# Patient Record
Sex: FEMALE | Race: WHITE | NOT HISPANIC OR LATINO | Employment: OTHER | URBAN - METROPOLITAN AREA
[De-identification: names, ages, dates, MRNs, and addresses within clinical notes are randomized per-mention and may not be internally consistent; named-entity substitution may affect disease eponyms.]

---

## 2020-01-07 ENCOUNTER — CLINICAL SUPPORT (OUTPATIENT)
Dept: CARDIOLOGY | Facility: HOSPITAL | Age: 80
End: 2020-01-07
Attending: INTERNAL MEDICINE
Payer: MEDICARE

## 2020-01-07 ENCOUNTER — INITIAL CONSULT (OUTPATIENT)
Dept: ELECTROPHYSIOLOGY | Facility: CLINIC | Age: 80
End: 2020-01-07
Payer: MEDICARE

## 2020-01-07 VITALS
HEART RATE: 78 BPM | WEIGHT: 172.19 LBS | HEIGHT: 65 IN | SYSTOLIC BLOOD PRESSURE: 130 MMHG | BODY MASS INDEX: 28.69 KG/M2 | DIASTOLIC BLOOD PRESSURE: 60 MMHG

## 2020-01-07 DIAGNOSIS — I50.42 CHRONIC COMBINED SYSTOLIC AND DIASTOLIC HEART FAILURE: Chronic | ICD-10-CM

## 2020-01-07 DIAGNOSIS — I10 ESSENTIAL HYPERTENSION: Chronic | ICD-10-CM

## 2020-01-07 DIAGNOSIS — Z95.810 ICD (IMPLANTABLE CARDIOVERTER-DEFIBRILLATOR), DUAL, IN SITU: ICD-10-CM

## 2020-01-07 DIAGNOSIS — I49.8 OTHER SPECIFIED CARDIAC ARRHYTHMIAS: ICD-10-CM

## 2020-01-07 DIAGNOSIS — G47.33 OBSTRUCTIVE SLEEP APNEA: Chronic | ICD-10-CM

## 2020-01-07 DIAGNOSIS — N18.4 TYPE 2 DIABETES MELLITUS WITH STAGE 4 CHRONIC KIDNEY DISEASE, WITHOUT LONG-TERM CURRENT USE OF INSULIN: Chronic | ICD-10-CM

## 2020-01-07 DIAGNOSIS — E03.9 ACQUIRED HYPOTHYROIDISM: Chronic | ICD-10-CM

## 2020-01-07 DIAGNOSIS — I49.8 OTHER SPECIFIED CARDIAC ARRHYTHMIAS: Primary | ICD-10-CM

## 2020-01-07 DIAGNOSIS — N18.4 CHRONIC KIDNEY DISEASE, STAGE 4 (SEVERE): Chronic | ICD-10-CM

## 2020-01-07 DIAGNOSIS — E11.22 TYPE 2 DIABETES MELLITUS WITH STAGE 4 CHRONIC KIDNEY DISEASE, WITHOUT LONG-TERM CURRENT USE OF INSULIN: Chronic | ICD-10-CM

## 2020-01-07 DIAGNOSIS — I44.7 LBBB (LEFT BUNDLE BRANCH BLOCK): Primary | Chronic | ICD-10-CM

## 2020-01-07 DIAGNOSIS — I48.0 PAROXYSMAL ATRIAL FIBRILLATION: Chronic | ICD-10-CM

## 2020-01-07 DIAGNOSIS — I50.42 CHRONIC COMBINED SYSTOLIC AND DIASTOLIC HEART FAILURE: Primary | ICD-10-CM

## 2020-01-07 DIAGNOSIS — Z95.810 ICD (IMPLANTABLE CARDIOVERTER-DEFIBRILLATOR), DUAL, IN SITU: Primary | ICD-10-CM

## 2020-01-07 PROCEDURE — 93010 RHYTHM STRIP: ICD-10-PCS | Mod: S$PBB,,, | Performed by: INTERNAL MEDICINE

## 2020-01-07 PROCEDURE — 99205 PR OFFICE/OUTPT VISIT, NEW, LEVL V, 60-74 MIN: ICD-10-PCS | Mod: S$PBB,,, | Performed by: INTERNAL MEDICINE

## 2020-01-07 PROCEDURE — 1159F MED LIST DOCD IN RCRD: CPT | Mod: ,,, | Performed by: INTERNAL MEDICINE

## 2020-01-07 PROCEDURE — 1125F PR PAIN SEVERITY QUANTIFIED, PAIN PRESENT: ICD-10-PCS | Mod: ,,, | Performed by: INTERNAL MEDICINE

## 2020-01-07 PROCEDURE — 99205 OFFICE O/P NEW HI 60 MIN: CPT | Mod: S$PBB,,, | Performed by: INTERNAL MEDICINE

## 2020-01-07 PROCEDURE — 99999 PR PBB SHADOW E&M-NEW PATIENT-LVL IV: CPT | Mod: PBBFAC,,, | Performed by: INTERNAL MEDICINE

## 2020-01-07 PROCEDURE — 93010 ELECTROCARDIOGRAM REPORT: CPT | Mod: S$PBB,,, | Performed by: INTERNAL MEDICINE

## 2020-01-07 PROCEDURE — 1125F AMNT PAIN NOTED PAIN PRSNT: CPT | Mod: ,,, | Performed by: INTERNAL MEDICINE

## 2020-01-07 PROCEDURE — 99204 OFFICE O/P NEW MOD 45 MIN: CPT | Mod: PBBFAC,25 | Performed by: INTERNAL MEDICINE

## 2020-01-07 PROCEDURE — 1159F PR MEDICATION LIST DOCUMENTED IN MEDICAL RECORD: ICD-10-PCS | Mod: ,,, | Performed by: INTERNAL MEDICINE

## 2020-01-07 PROCEDURE — 99999 PR PBB SHADOW E&M-NEW PATIENT-LVL IV: ICD-10-PCS | Mod: PBBFAC,,, | Performed by: INTERNAL MEDICINE

## 2020-01-07 PROCEDURE — 93005 ELECTROCARDIOGRAM TRACING: CPT | Mod: PBBFAC | Performed by: INTERNAL MEDICINE

## 2020-01-07 PROCEDURE — 93283 PRGRMG EVAL IMPLANTABLE DFB: CPT

## 2020-01-07 RX ORDER — CARVEDILOL 12.5 MG/1
12.5 TABLET ORAL 2 TIMES DAILY WITH MEALS
COMMUNITY

## 2020-01-07 RX ORDER — LANOLIN ALCOHOL/MO/W.PET/CERES
100 CREAM (GRAM) TOPICAL DAILY
COMMUNITY

## 2020-01-07 RX ORDER — CHOLECALCIFEROL (VITAMIN D3) 25 MCG
1000 TABLET ORAL DAILY
COMMUNITY

## 2020-01-07 RX ORDER — PANTOPRAZOLE SODIUM 40 MG/1
40 TABLET, DELAYED RELEASE ORAL DAILY
COMMUNITY

## 2020-01-07 RX ORDER — FUROSEMIDE 80 MG/1
80 TABLET ORAL 2 TIMES DAILY
COMMUNITY

## 2020-01-07 RX ORDER — ATORVASTATIN CALCIUM 80 MG/1
80 TABLET, FILM COATED ORAL DAILY
COMMUNITY

## 2020-01-07 RX ORDER — NAPROXEN SODIUM 220 MG/1
81 TABLET, FILM COATED ORAL DAILY
COMMUNITY

## 2020-01-07 RX ORDER — FERROUS SULFATE 325(65) MG
325 TABLET ORAL
COMMUNITY

## 2020-01-07 RX ORDER — FLUOXETINE HYDROCHLORIDE 20 MG/1
20 CAPSULE ORAL DAILY
COMMUNITY

## 2020-01-07 RX ORDER — AMIODARONE HYDROCHLORIDE 200 MG/1
TABLET ORAL DAILY
COMMUNITY

## 2020-01-07 RX ORDER — LEVOTHYROXINE SODIUM 100 UG/1
100 TABLET ORAL DAILY
COMMUNITY

## 2020-01-07 RX ORDER — CALCIUM CARBONATE 600 MG
600 TABLET ORAL ONCE
COMMUNITY

## 2020-01-07 RX ORDER — BISACODYL 5 MG
5 TABLET, DELAYED RELEASE (ENTERIC COATED) ORAL DAILY PRN
COMMUNITY

## 2020-01-07 RX ORDER — AMLODIPINE BESYLATE 2.5 MG/1
2.5 TABLET ORAL DAILY
COMMUNITY

## 2020-01-07 NOTE — H&P (VIEW-ONLY)
Subjective:    Patient ID:  Iwona Gupta is a 79 y.o. female who presents for evaluation of Congestive Heart Failure and LBBB    Referring Cardiologist: Artur Morrison MD ([facility])    HPI  I had the pleasure of seeing Ms. Gupta in our electrophysiology clinic in consultation for her nonischemic cardiomyopathy. As you are aware she is a pleasant 79 year-old woman with chronic nonischemic CMP (EF<30%, NYHA class II symptoms) despite long-term goal directed medical therapy with wide LBBB, paroxysmal atrial fibrillation, chronic kidney disease stage 4, hypertension, type 2 diabetes mellitus, hypothyroidism, and obstructive sleep apnea. She underwent attempt at CRT-D implantation January of 2019 that was complicated by dissection of the coronary sinus. A dual chamber ICD was implanted instead. She returned for reattempt at implantation September of 2019 and was unable to have the lead delivered. She presents today for evaluation for 3rd attempt at implantation of a coronary sinus lead. She has a cardiomems implant.    An echocaerdiogram from 1/25/2019 noted an LVEF of 30-35%    Device interrogation noted implant date 1/2019, dcICD, stable lead parameters, 81% RA and 1% RV pacing, no events    My interpretation of today's in clinic electrocardiogram is sinus rhythm with a rate of 78 bpm with MS interval of 280msec and left bundle type IVCD at 160msec.    Review of Systems   Constitution: Positive for malaise/fatigue. Negative for fever.   HENT: Negative for congestion and sore throat.    Eyes: Negative for blurred vision and visual disturbance.   Cardiovascular: Positive for dyspnea on exertion. Negative for chest pain, irregular heartbeat, leg swelling, near-syncope, orthopnea, palpitations, paroxysmal nocturnal dyspnea and syncope.   Respiratory: Negative for cough and shortness of breath.    Hematologic/Lymphatic: Negative for bleeding problem. Bruises/bleeds easily.   Skin: Negative.    Musculoskeletal: Negative.     Gastrointestinal: Negative for bloating, abdominal pain, hematochezia and melena.   Neurological: Negative for focal weakness and weakness.        Objective:    Physical Exam   Constitutional: She is oriented to person, place, and time. She appears well-developed and well-nourished. No distress.   HENT:   Head: Normocephalic and atraumatic.   Eyes: Conjunctivae are normal. Right eye exhibits no discharge. Left eye exhibits no discharge.   Neck: Neck supple. No JVD present.   Cardiovascular: Normal rate and regular rhythm. Exam reveals no gallop and no friction rub.   No murmur heard.  Pulmonary/Chest: Effort normal and breath sounds normal. No respiratory distress. She has no wheezes. She has no rales.   Abdominal: Soft. Bowel sounds are normal. She exhibits no distension. There is no tenderness. There is no rebound.   Musculoskeletal: She exhibits no edema.   Neurological: She is alert and oriented to person, place, and time.   Skin: Skin is warm and dry. No rash noted. She is not diaphoretic. No erythema.   Psychiatric: She has a normal mood and affect. Her behavior is normal. Judgment and thought content normal.   Vitals reviewed.        Assessment:       1. LBBB (left bundle branch block)    2. Chronic combined systolic and diastolic heart failure    3. Paroxysmal atrial fibrillation    4. Essential hypertension    5. Chronic kidney disease, stage 4 (severe)    6. Acquired hypothyroidism    7. Obstructive sleep apnea    8. Type 2 diabetes mellitus with stage 4 chronic kidney disease, without long-term current use of insulin    9. Other specified cardiac arrhythmias         Plan:       In summary, Ms. Gupta is a pleasant 79 year-old woman with chronic nonischemic CMP (EF<30%, NYHA class II symptoms) despite long-term goal directed medical therapy with wide LBBB (left bundle type IVCD on our in-clinic electrocardiogram), paroxysmal atrial fibrillation, chronic kidney disease stage 4, hypertension, type 2  diabetes mellitus, hypothyroidism, and obstructive sleep apnea presenting for consideration for 3rd attempt at LV lead addition due to challenging anatomy. We discussed the alternatives, benefits and risks of the procedure including pain, infection, bleeding, injury to lung causing pneumothorax requiring tube placement, injury to heart valves, puncture of the heart leading to pericardial effusion or tamponade requiring tube drainage, heart attack, stroke and death. She is willing to try a 3rd attempt. If unable to access the coronary sinus or deliver a lead, will try pacing in the his-bundle region to see if this can sufficiently narrow the QRS. If this does not give a good result then last option would be to refer to Dr. Alvarenga with CT surgery for epicardial LV lead placement. She understood and desired to proceed. Consent signed. She had no further questions regarding the planned procedure.    Thank you for allowing me to participate in the care of this patient. Please do not hesitate to call me with any questions or concerns.    Jose Tse MD, PhD  Cardiac Electrophysiology

## 2020-01-07 NOTE — PROGRESS NOTES
Subjective:    Patient ID:  Iwona Gupta is a 79 y.o. female who presents for evaluation of Congestive Heart Failure and LBBB    Referring Cardiologist: Artur Morrison MD ([facility])    HPI  I had the pleasure of seeing Ms. Gupta in our electrophysiology clinic in consultation for her nonischemic cardiomyopathy. As you are aware she is a pleasant 79 year-old woman with chronic nonischemic CMP (EF<30%, NYHA class II symptoms) despite long-term goal directed medical therapy with wide LBBB, paroxysmal atrial fibrillation, chronic kidney disease stage 4, hypertension, type 2 diabetes mellitus, hypothyroidism, and obstructive sleep apnea. She underwent attempt at CRT-D implantation January of 2019 that was complicated by dissection of the coronary sinus. A dual chamber ICD was implanted instead. She returned for reattempt at implantation September of 2019 and was unable to have the lead delivered. She presents today for evaluation for 3rd attempt at implantation of a coronary sinus lead. She has a cardiomems implant.    An echocaerdiogram from 1/25/2019 noted an LVEF of 30-35%    Device interrogation noted implant date 1/2019, dcICD, stable lead parameters, 81% RA and 1% RV pacing, no events    My interpretation of today's in clinic electrocardiogram is sinus rhythm with a rate of 78 bpm with [place] interval of 280msec and left bundle type IVCD at 160msec.    Review of Systems   Constitution: Positive for malaise/fatigue. Negative for fever.   HENT: Negative for congestion and sore throat.    Eyes: Negative for blurred vision and visual disturbance.   Cardiovascular: Positive for dyspnea on exertion. Negative for chest pain, irregular heartbeat, leg swelling, near-syncope, orthopnea, palpitations, paroxysmal nocturnal dyspnea and syncope.   Respiratory: Negative for cough and shortness of breath.    Hematologic/Lymphatic: Negative for bleeding problem. Bruises/bleeds easily.   Skin: Negative.    Musculoskeletal: Negative.     Gastrointestinal: Negative for bloating, abdominal pain, hematochezia and melena.   Neurological: Negative for focal weakness and weakness.        Objective:    Physical Exam   Constitutional: She is oriented to person, place, and time. She appears well-developed and well-nourished. No distress.   HENT:   Head: Normocephalic and atraumatic.   Eyes: Conjunctivae are normal. Right eye exhibits no discharge. Left eye exhibits no discharge.   Neck: Neck supple. No JVD present.   Cardiovascular: Normal rate and regular rhythm. Exam reveals no gallop and no friction rub.   No murmur heard.  Pulmonary/Chest: Effort normal and breath sounds normal. No respiratory distress. She has no wheezes. She has no rales.   Abdominal: Soft. Bowel sounds are normal. She exhibits no distension. There is no tenderness. There is no rebound.   Musculoskeletal: She exhibits no edema.   Neurological: She is alert and oriented to person, place, and time.   Skin: Skin is warm and dry. No rash noted. She is not diaphoretic. No erythema.   Psychiatric: She has a normal mood and affect. Her behavior is normal. Judgment and thought content normal.   Vitals reviewed.        Assessment:       1. LBBB (left bundle branch block)    2. Chronic combined systolic and diastolic heart failure    3. Paroxysmal atrial fibrillation    4. Essential hypertension    5. Chronic kidney disease, stage 4 (severe)    6. Acquired hypothyroidism    7. Obstructive sleep apnea    8. Type 2 diabetes mellitus with stage 4 chronic kidney disease, without long-term current use of insulin    9. Other specified cardiac arrhythmias         Plan:       In summary, Ms. Gupta is a pleasant 79 year-old woman with chronic nonischemic CMP (EF<30%, NYHA class II symptoms) despite long-term goal directed medical therapy with wide LBBB (left bundle type IVCD on our in-clinic electrocardiogram), paroxysmal atrial fibrillation, chronic kidney disease stage 4, hypertension, type 2  diabetes mellitus, hypothyroidism, and obstructive sleep apnea presenting for consideration for 3rd attempt at LV lead addition due to challenging anatomy. We discussed the alternatives, benefits and risks of the procedure including pain, infection, bleeding, injury to lung causing pneumothorax requiring tube placement, injury to heart valves, puncture of the heart leading to pericardial effusion or tamponade requiring tube drainage, heart attack, stroke and death. She is willing to try a 3rd attempt. If unable to access the coronary sinus or deliver a lead, will try pacing in the his-bundle region to see if this can sufficiently narrow the QRS. If this does not give a good result then last option would be to refer to Dr. Alvarenga with CT surgery for epicardial LV lead placement. She understood and desired to proceed. Consent signed. She had no further questions regarding the planned procedure.    Thank you for allowing me to participate in the care of this patient. Please do not hesitate to call me with any questions or concerns.    Jose Tse MD, PhD  Cardiac Electrophysiology

## 2020-01-08 ENCOUNTER — TELEPHONE (OUTPATIENT)
Dept: ELECTROPHYSIOLOGY | Facility: CLINIC | Age: 80
End: 2020-01-08

## 2020-01-08 DIAGNOSIS — I48.0 PAROXYSMAL ATRIAL FIBRILLATION: ICD-10-CM

## 2020-01-08 DIAGNOSIS — I44.7 LBBB (LEFT BUNDLE BRANCH BLOCK): ICD-10-CM

## 2020-01-08 DIAGNOSIS — I50.42 CHRONIC COMBINED SYSTOLIC AND DIASTOLIC HEART FAILURE: Primary | ICD-10-CM

## 2020-01-08 DIAGNOSIS — Z95.810 ICD (IMPLANTABLE CARDIOVERTER-DEFIBRILLATOR), DUAL, IN SITU: ICD-10-CM

## 2020-01-08 NOTE — TELEPHONE ENCOUNTER
----- Message from Jose Tse MD sent at 1/7/2020  4:06 PM CST -----  Can you fax my note on Ms. Gupta to Dr. Morrison?    [phone]    Thanks

## 2020-01-15 ENCOUNTER — TELEPHONE (OUTPATIENT)
Dept: ELECTROPHYSIOLOGY | Facility: CLINIC | Age: 80
End: 2020-01-15

## 2020-01-15 NOTE — TELEPHONE ENCOUNTER
----- Message from Baldo Bahena MA sent at 1/15/2020  9:33 AM CST -----  Contact: pt      ----- Message -----  From: Beti Montelongo  Sent: 1/14/2020   5:50 PM CST  To: Dedrick Garcia A Staff    Pt called she lost paper with meds she is to stop before surgery and needs to find out the info    Pt can be reached at [phone]

## 2020-01-15 NOTE — TELEPHONE ENCOUNTER
Spoke with Ms. Bustillo.  Reviewed medication instructions as listed below:    -Hold Eliquis 3 days prior to procedure.  Last dose taken on evening of 2/1/20  -Hold Januvia, Amlodipine, Entresto, and Lasix day of procedure.  Last dose taken on 2/4/20.    Ms. Bustillo verbalizes understanding and will call with any additional questions or concerns.

## 2020-02-03 ENCOUNTER — PATIENT MESSAGE (OUTPATIENT)
Dept: ELECTROPHYSIOLOGY | Facility: CLINIC | Age: 80
End: 2020-02-03

## 2020-02-03 NOTE — TELEPHONE ENCOUNTER
Sung Gilman Ms. Candy is scheduled to have a MDT CRT-D upgrade with Dr. Tse on 2/5/2020.    Thanks,    Cori

## 2020-02-04 ENCOUNTER — TELEPHONE (OUTPATIENT)
Dept: ELECTROPHYSIOLOGY | Facility: CLINIC | Age: 80
End: 2020-02-04

## 2020-02-05 ENCOUNTER — ANESTHESIA EVENT (OUTPATIENT)
Dept: MEDSURG UNIT | Facility: HOSPITAL | Age: 80
End: 2020-02-05
Payer: MEDICARE

## 2020-02-05 ENCOUNTER — HOSPITAL ENCOUNTER (OUTPATIENT)
Facility: HOSPITAL | Age: 80
Discharge: HOME | End: 2020-02-06
Attending: INTERNAL MEDICINE | Admitting: INTERNAL MEDICINE
Payer: MEDICARE

## 2020-02-05 ENCOUNTER — ANESTHESIA (OUTPATIENT)
Dept: MEDSURG UNIT | Facility: HOSPITAL | Age: 80
End: 2020-02-05
Payer: MEDICARE

## 2020-02-05 DIAGNOSIS — I49.9 ARRHYTHMIA: ICD-10-CM

## 2020-02-05 DIAGNOSIS — I48.0 PAROXYSMAL ATRIAL FIBRILLATION: ICD-10-CM

## 2020-02-05 DIAGNOSIS — I44.7 LBBB (LEFT BUNDLE BRANCH BLOCK): ICD-10-CM

## 2020-02-05 DIAGNOSIS — I50.42 CHRONIC COMBINED SYSTOLIC AND DIASTOLIC HEART FAILURE: ICD-10-CM

## 2020-02-05 DIAGNOSIS — Z95.810 ICD (IMPLANTABLE CARDIOVERTER-DEFIBRILLATOR), DUAL, IN SITU: ICD-10-CM

## 2020-02-05 DIAGNOSIS — Z01.812 PRE-PROCEDURE LAB EXAM: ICD-10-CM

## 2020-02-05 LAB
POCT GLUCOSE: 103 MG/DL (ref 70–110)
POCT GLUCOSE: 136 MG/DL (ref 70–110)

## 2020-02-05 PROCEDURE — 33225 L VENTRIC PACING LEAD ADD-ON: CPT | Mod: ,,, | Performed by: INTERNAL MEDICINE

## 2020-02-05 PROCEDURE — 93010 ELECTROCARDIOGRAM REPORT: CPT | Mod: 76,,, | Performed by: INTERNAL MEDICINE

## 2020-02-05 PROCEDURE — 33264 PR REMV&REPLC CVD GEN MULT LEAD: ICD-10-PCS | Mod: 22,,, | Performed by: INTERNAL MEDICINE

## 2020-02-05 PROCEDURE — 33264 RMVL & RPLCMT DFB GEN MLT LD: CPT | Mod: 22 | Performed by: INTERNAL MEDICINE

## 2020-02-05 PROCEDURE — 37000009 HC ANESTHESIA EA ADD 15 MINS: Performed by: INTERNAL MEDICINE

## 2020-02-05 PROCEDURE — 25000003 PHARM REV CODE 250: Performed by: STUDENT IN AN ORGANIZED HEALTH CARE EDUCATION/TRAINING PROGRAM

## 2020-02-05 PROCEDURE — 25000003 PHARM REV CODE 250: Performed by: NURSE ANESTHETIST, CERTIFIED REGISTERED

## 2020-02-05 PROCEDURE — 63600175 PHARM REV CODE 636 W HCPCS: Performed by: NURSE ANESTHETIST, CERTIFIED REGISTERED

## 2020-02-05 PROCEDURE — 33225 PR INSRT PACING ELECT,AT INSERT NEW DEVICE: ICD-10-PCS | Mod: ,,, | Performed by: INTERNAL MEDICINE

## 2020-02-05 PROCEDURE — C1887 CATHETER, GUIDING: HCPCS | Performed by: INTERNAL MEDICINE

## 2020-02-05 PROCEDURE — 93005 ELECTROCARDIOGRAM TRACING: CPT | Mod: 59

## 2020-02-05 PROCEDURE — 33225 L VENTRIC PACING LEAD ADD-ON: CPT | Performed by: INTERNAL MEDICINE

## 2020-02-05 PROCEDURE — 82962 GLUCOSE BLOOD TEST: CPT

## 2020-02-05 PROCEDURE — D9220A PRA ANESTHESIA: ICD-10-PCS | Mod: ANES,,, | Performed by: ANESTHESIOLOGY

## 2020-02-05 PROCEDURE — D9220A PRA ANESTHESIA: Mod: CRNA,,, | Performed by: NURSE ANESTHETIST, CERTIFIED REGISTERED

## 2020-02-05 PROCEDURE — C1882 AICD, OTHER THAN SING/DUAL: HCPCS | Performed by: INTERNAL MEDICINE

## 2020-02-05 PROCEDURE — D9220A PRA ANESTHESIA: ICD-10-PCS | Mod: CRNA,,, | Performed by: NURSE ANESTHETIST, CERTIFIED REGISTERED

## 2020-02-05 PROCEDURE — 25500020 PHARM REV CODE 255: Performed by: NURSE ANESTHETIST, CERTIFIED REGISTERED

## 2020-02-05 PROCEDURE — 93010 ELECTROCARDIOGRAM REPORT: CPT | Mod: ,,, | Performed by: INTERNAL MEDICINE

## 2020-02-05 PROCEDURE — 37000008 HC ANESTHESIA 1ST 15 MINUTES: Performed by: INTERNAL MEDICINE

## 2020-02-05 PROCEDURE — C2628 CATHETER, OCCLUSION: HCPCS | Performed by: INTERNAL MEDICINE

## 2020-02-05 PROCEDURE — 63600175 PHARM REV CODE 636 W HCPCS: Performed by: NURSE PRACTITIONER

## 2020-02-05 PROCEDURE — 25000003 PHARM REV CODE 250: Performed by: INTERNAL MEDICINE

## 2020-02-05 PROCEDURE — A4216 STERILE WATER/SALINE, 10 ML: HCPCS | Performed by: INTERNAL MEDICINE

## 2020-02-05 PROCEDURE — 94761 N-INVAS EAR/PLS OXIMETRY MLT: CPT

## 2020-02-05 PROCEDURE — 33264 RMVL & RPLCMT DFB GEN MLT LD: CPT | Mod: 22,,, | Performed by: INTERNAL MEDICINE

## 2020-02-05 PROCEDURE — 82962 GLUCOSE BLOOD TEST: CPT | Performed by: INTERNAL MEDICINE

## 2020-02-05 PROCEDURE — C1900 LEAD, CORONARY VENOUS: HCPCS | Performed by: INTERNAL MEDICINE

## 2020-02-05 PROCEDURE — 87075 CULTR BACTERIA EXCEPT BLOOD: CPT

## 2020-02-05 PROCEDURE — 25500020 PHARM REV CODE 255: Performed by: INTERNAL MEDICINE

## 2020-02-05 PROCEDURE — C1779 LEAD, PMKR, TRANSVENOUS VDD: HCPCS | Performed by: INTERNAL MEDICINE

## 2020-02-05 PROCEDURE — C1894 INTRO/SHEATH, NON-LASER: HCPCS | Performed by: INTERNAL MEDICINE

## 2020-02-05 PROCEDURE — 27201423 OPTIME MED/SURG SUP & DEVICES STERILE SUPPLY: Performed by: INTERNAL MEDICINE

## 2020-02-05 PROCEDURE — C1769 GUIDE WIRE: HCPCS | Performed by: INTERNAL MEDICINE

## 2020-02-05 PROCEDURE — A4216 STERILE WATER/SALINE, 10 ML: HCPCS | Performed by: NURSE ANESTHETIST, CERTIFIED REGISTERED

## 2020-02-05 PROCEDURE — 93005 ELECTROCARDIOGRAM TRACING: CPT

## 2020-02-05 PROCEDURE — 93010 EKG 12-LEAD: ICD-10-PCS | Mod: ,,, | Performed by: INTERNAL MEDICINE

## 2020-02-05 PROCEDURE — 87070 CULTURE OTHR SPECIMN AEROBIC: CPT

## 2020-02-05 PROCEDURE — D9220A PRA ANESTHESIA: Mod: ANES,,, | Performed by: ANESTHESIOLOGY

## 2020-02-05 PROCEDURE — 63600175 PHARM REV CODE 636 W HCPCS: Performed by: INTERNAL MEDICINE

## 2020-02-05 DEVICE — LEAD 383069 MRI US
Type: IMPLANTABLE DEVICE | Site: HEART | Status: FUNCTIONAL
Brand: SELECTSECURE™ MRI SURESCAN™

## 2020-02-05 DEVICE — CRTD DTMB1D4 AMPLIA MRI CRTD US
Type: IMPLANTABLE DEVICE | Site: CHEST | Status: FUNCTIONAL
Brand: AMPLIA MRI™ CRT-D SURESCAN™

## 2020-02-05 RX ORDER — LEVOTHYROXINE SODIUM 100 UG/1
100 TABLET ORAL DAILY
Status: CANCELLED | OUTPATIENT
Start: 2020-02-06

## 2020-02-05 RX ORDER — CEFAZOLIN SODIUM 1 G/3ML
2 INJECTION, POWDER, FOR SOLUTION INTRAMUSCULAR; INTRAVENOUS
Status: COMPLETED | OUTPATIENT
Start: 2020-02-05 | End: 2020-02-05

## 2020-02-05 RX ORDER — FUROSEMIDE 20 MG/1
80 TABLET ORAL 2 TIMES DAILY
Status: CANCELLED | OUTPATIENT
Start: 2020-02-06

## 2020-02-05 RX ORDER — CARVEDILOL 3.12 MG/1
12.5 TABLET ORAL 2 TIMES DAILY WITH MEALS
Status: CANCELLED | OUTPATIENT
Start: 2020-02-05

## 2020-02-05 RX ORDER — IBUPROFEN 200 MG
16 TABLET ORAL
Status: DISCONTINUED | OUTPATIENT
Start: 2020-02-05 | End: 2020-02-06 | Stop reason: HOSPADM

## 2020-02-05 RX ORDER — AMIODARONE HYDROCHLORIDE 200 MG/1
200 TABLET ORAL DAILY
Status: CANCELLED | OUTPATIENT
Start: 2020-02-06

## 2020-02-05 RX ORDER — ACETAMINOPHEN 325 MG/1
650 TABLET ORAL EVERY 4 HOURS PRN
Status: DISCONTINUED | OUTPATIENT
Start: 2020-02-05 | End: 2020-02-06 | Stop reason: HOSPADM

## 2020-02-05 RX ORDER — VANCOMYCIN HYDROCHLORIDE 1 G/20ML
INJECTION, POWDER, LYOPHILIZED, FOR SOLUTION INTRAVENOUS
Status: DISCONTINUED | OUTPATIENT
Start: 2020-02-05 | End: 2020-02-06 | Stop reason: HOSPADM

## 2020-02-05 RX ORDER — PROPOFOL 10 MG/ML
VIAL (ML) INTRAVENOUS
Status: DISCONTINUED | OUTPATIENT
Start: 2020-02-05 | End: 2020-02-05

## 2020-02-05 RX ORDER — LIDOCAINE HYDROCHLORIDE 20 MG/ML
INJECTION, SOLUTION INFILTRATION; PERINEURAL
Status: DISCONTINUED | OUTPATIENT
Start: 2020-02-05 | End: 2020-02-06 | Stop reason: HOSPADM

## 2020-02-05 RX ORDER — IODIXANOL 320 MG/ML
INJECTION, SOLUTION INTRAVASCULAR
Status: DISCONTINUED | OUTPATIENT
Start: 2020-02-05 | End: 2020-02-06 | Stop reason: HOSPADM

## 2020-02-05 RX ORDER — GLUCAGON 1 MG
1 KIT INJECTION
Status: DISCONTINUED | OUTPATIENT
Start: 2020-02-05 | End: 2020-02-06 | Stop reason: HOSPADM

## 2020-02-05 RX ORDER — DEXMEDETOMIDINE HYDROCHLORIDE 100 UG/ML
INJECTION, SOLUTION INTRAVENOUS
Status: DISCONTINUED | OUTPATIENT
Start: 2020-02-05 | End: 2020-02-05

## 2020-02-05 RX ORDER — ATORVASTATIN CALCIUM 20 MG/1
80 TABLET, FILM COATED ORAL NIGHTLY
Status: CANCELLED | OUTPATIENT
Start: 2020-02-05

## 2020-02-05 RX ORDER — SODIUM CHLORIDE 9 MG/ML
INJECTION, SOLUTION INTRAVENOUS CONTINUOUS
Status: DISCONTINUED | OUTPATIENT
Start: 2020-02-05 | End: 2020-02-05

## 2020-02-05 RX ORDER — PANTOPRAZOLE SODIUM 40 MG/1
40 TABLET, DELAYED RELEASE ORAL DAILY
Status: CANCELLED | OUTPATIENT
Start: 2020-02-06

## 2020-02-05 RX ORDER — FENTANYL CITRATE 50 UG/ML
25 INJECTION, SOLUTION INTRAMUSCULAR; INTRAVENOUS EVERY 5 MIN PRN
Status: DISCONTINUED | OUTPATIENT
Start: 2020-02-05 | End: 2020-02-05 | Stop reason: HOSPADM

## 2020-02-05 RX ORDER — SODIUM CHLORIDE 0.9 % (FLUSH) 0.9 %
3 SYRINGE (ML) INJECTION
Status: DISCONTINUED | OUTPATIENT
Start: 2020-02-05 | End: 2020-02-05 | Stop reason: HOSPADM

## 2020-02-05 RX ORDER — SODIUM CHLORIDE 9 MG/ML
INJECTION, SOLUTION INTRAVENOUS CONTINUOUS PRN
Status: DISCONTINUED | OUTPATIENT
Start: 2020-02-05 | End: 2020-02-05

## 2020-02-05 RX ORDER — INSULIN ASPART 100 [IU]/ML
0-5 INJECTION, SOLUTION INTRAVENOUS; SUBCUTANEOUS
Status: DISCONTINUED | OUTPATIENT
Start: 2020-02-05 | End: 2020-02-06 | Stop reason: HOSPADM

## 2020-02-05 RX ORDER — IODIXANOL 320 MG/ML
INJECTION, SOLUTION INTRAVASCULAR
Status: DISCONTINUED | OUTPATIENT
Start: 2020-02-05 | End: 2020-02-05

## 2020-02-05 RX ORDER — AMLODIPINE BESYLATE 2.5 MG/1
2.5 TABLET ORAL DAILY
Status: CANCELLED | OUTPATIENT
Start: 2020-02-06

## 2020-02-05 RX ORDER — CEFAZOLIN SODIUM 1 G/3ML
2 INJECTION, POWDER, FOR SOLUTION INTRAMUSCULAR; INTRAVENOUS
Status: CANCELLED | OUTPATIENT
Start: 2020-02-05 | End: 2020-02-06

## 2020-02-05 RX ORDER — FENTANYL CITRATE 50 UG/ML
INJECTION, SOLUTION INTRAMUSCULAR; INTRAVENOUS
Status: DISCONTINUED | OUTPATIENT
Start: 2020-02-05 | End: 2020-02-05

## 2020-02-05 RX ORDER — IBUPROFEN 200 MG
24 TABLET ORAL
Status: DISCONTINUED | OUTPATIENT
Start: 2020-02-05 | End: 2020-02-06 | Stop reason: HOSPADM

## 2020-02-05 RX ORDER — SODIUM CHLORIDE 9 MG/ML
INJECTION, SOLUTION INTRAMUSCULAR; INTRAVENOUS; SUBCUTANEOUS
Status: DISCONTINUED | OUTPATIENT
Start: 2020-02-05 | End: 2020-02-06 | Stop reason: HOSPADM

## 2020-02-05 RX ORDER — LIDOCAINE HYDROCHLORIDE 20 MG/ML
INJECTION INTRAVENOUS
Status: DISCONTINUED | OUTPATIENT
Start: 2020-02-05 | End: 2020-02-05

## 2020-02-05 RX ORDER — GLYCOPYRROLATE 0.2 MG/ML
INJECTION INTRAMUSCULAR; INTRAVENOUS
Status: DISCONTINUED | OUTPATIENT
Start: 2020-02-05 | End: 2020-02-05

## 2020-02-05 RX ORDER — BUPIVACAINE HYDROCHLORIDE 2.5 MG/ML
INJECTION, SOLUTION EPIDURAL; INFILTRATION; INTRACAUDAL
Status: DISCONTINUED | OUTPATIENT
Start: 2020-02-05 | End: 2020-02-06 | Stop reason: HOSPADM

## 2020-02-05 RX ADMIN — PROPOFOL 20 MG: 10 INJECTION, EMULSION INTRAVENOUS at 03:02

## 2020-02-05 RX ADMIN — DEXMEDETOMIDINE HYDROCHLORIDE 0.5 MCG/KG/HR: 100 INJECTION, SOLUTION, CONCENTRATE INTRAVENOUS at 01:02

## 2020-02-05 RX ADMIN — LIDOCAINE HYDROCHLORIDE 50 MG: 20 INJECTION, SOLUTION INTRAVENOUS at 01:02

## 2020-02-05 RX ADMIN — FENTANYL CITRATE 25 MCG: 50 INJECTION, SOLUTION INTRAMUSCULAR; INTRAVENOUS at 04:02

## 2020-02-05 RX ADMIN — SODIUM CHLORIDE: 0.9 INJECTION, SOLUTION INTRAVENOUS at 01:02

## 2020-02-05 RX ADMIN — PROPOFOL 20 MG: 10 INJECTION, EMULSION INTRAVENOUS at 04:02

## 2020-02-05 RX ADMIN — PROPOFOL 20 MG: 10 INJECTION, EMULSION INTRAVENOUS at 02:02

## 2020-02-05 RX ADMIN — PROPOFOL 20 MG: 10 INJECTION, EMULSION INTRAVENOUS at 01:02

## 2020-02-05 RX ADMIN — PROPOFOL 10 MG: 10 INJECTION, EMULSION INTRAVENOUS at 03:02

## 2020-02-05 RX ADMIN — ACETAMINOPHEN 650 MG: 325 TABLET ORAL at 10:02

## 2020-02-05 RX ADMIN — DEXMEDETOMIDINE HYDROCHLORIDE 0.5 MCG: 100 INJECTION, SOLUTION, CONCENTRATE INTRAVENOUS at 01:02

## 2020-02-05 RX ADMIN — GLYCOPYRROLATE 0.2 MG: 0.2 INJECTION, SOLUTION INTRAMUSCULAR; INTRAVENOUS at 01:02

## 2020-02-05 RX ADMIN — FENTANYL CITRATE 25 MCG: 50 INJECTION, SOLUTION INTRAMUSCULAR; INTRAVENOUS at 01:02

## 2020-02-05 RX ADMIN — IODIXANOL 10 ML: 320 INJECTION, SOLUTION INTRAVASCULAR at 02:02

## 2020-02-05 RX ADMIN — CEFAZOLIN 2 G: 330 INJECTION, POWDER, FOR SOLUTION INTRAMUSCULAR; INTRAVENOUS at 01:02

## 2020-02-05 NOTE — Clinical Note
The sheath is inserted through the larger sheath in the left axillary vein. Inserted through 9fr sheath

## 2020-02-05 NOTE — Clinical Note
Lead inserted, under fluorscopic guidance, into the . New leads were attached to the following locations: other lead. bundle of his

## 2020-02-05 NOTE — Clinical Note
Preexisting leads RA- MDT, Model: 5076-45 SN: [id number] IMP: 1/25/2019  RV- MDT Model: 6935M-62 SN: [id number] IMP: 1/25/2019  connected to new generator

## 2020-02-05 NOTE — Clinical Note
Lead repositioned to the . New leads were attached to the following locations: other lead. bundle of HIS

## 2020-02-05 NOTE — Clinical Note
A venogram was performed in the left axillary vein. The vessel was injected with hand injection with 10 mL of contrast. Given by CRNA

## 2020-02-05 NOTE — Clinical Note
Lead thresholds tested in the . New leads were attached to the following locations: other lead. bundle of HIS

## 2020-02-05 NOTE — Clinical Note
A venogram was performed in the coronary sinus. The vessel was injected with hand injection with 6 mL of contrast.

## 2020-02-05 NOTE — Clinical Note
Updated pt's daughter via phone, and she was told that Dr Tse is coming to speak to her in the waiting room

## 2020-02-05 NOTE — ANESTHESIA PREPROCEDURE EVALUATION
02/05/2020  Pre-operative evaluation for Procedure(s) (LRB):  INSERTION, ICD, BIVENTRICULAR (N/A)    Iwona Gupta is a 79 y.o. female NICM (EF <30%), LBBB, a fib, CKD4, HTN, DM2, OTTO here with AICD for coronary sinus lead attempt for CRT-D upgrade (3rd attempt, previous coronary sinus dissection). Significant RODRIGUEZ, METS <4.     Patient Active Problem List   Diagnosis    Chronic combined systolic and diastolic heart failure    Paroxysmal atrial fibrillation    LBBB (left bundle branch block)    Essential hypertension    Chronic kidney disease, stage 4 (severe)    Acquired hypothyroidism    Obstructive sleep apnea    Type 2 diabetes mellitus with stage 4 chronic kidney disease, without long-term current use of insulin       Review of patient's allergies indicates:  No Known Allergies    No current facility-administered medications on file prior to encounter.      Current Outpatient Medications on File Prior to Encounter   Medication Sig Dispense Refill    amiodarone (PACERONE) 200 MG Tab Take by mouth once daily.      amLODIPine (NORVASC) 2.5 MG tablet Take 2.5 mg by mouth once daily.      aspirin 81 MG Chew Take 81 mg by mouth once daily.      atorvastatin (LIPITOR) 80 MG tablet Take 80 mg by mouth once daily.      calcium carbonate (OS-RAJESH) 600 mg calcium (1,500 mg) Tab Take 600 mg by mouth once.      carvedilol (COREG) 12.5 MG tablet Take 12.5 mg by mouth 2 (two) times daily with meals.      cyanocobalamin (VITAMIN B-12) 1000 MCG tablet Take 100 mcg by mouth once daily.      FLUoxetine 20 MG capsule Take 20 mg by mouth once daily.      levothyroxine (SYNTHROID) 100 MCG tablet Take 100 mcg by mouth once daily.      pantoprazole (PROTONIX) 40 MG tablet Take 40 mg by mouth once daily.      sacubitril-valsartan (ENTRESTO) 49-51 mg per tablet Take 1 tablet by mouth 2 (two) times daily.       vitamin D (VITAMIN D3) 1000 units Tab Take 1,000 Units by mouth once daily.      apixaban (ELIQUIS) 5 mg Tab Take 5 mg by mouth 2 (two) times daily.      bisacodyl (DULCOLAX) 5 mg EC tablet Take 5 mg by mouth daily as needed for Constipation.      ferrous sulfate (FEOSOL) 325 mg (65 mg iron) Tab tablet Take 325 mg by mouth daily with breakfast.      furosemide (LASIX) 80 MG tablet Take 80 mg by mouth 2 (two) times daily.      ranitidine (ZANTAC) 150 MG capsule Take 150 mg by mouth 2 (two) times daily.      SITagliptin (JANUVIA) 25 MG Tab Take 100 mg by mouth once daily.         Past Surgical History:   Procedure Laterality Date    APPENDECTOMY      CARDIAC DEFIBRILLATOR PLACEMENT      CHOLECYSTECTOMY      HYSTERECTOMY      TONSILLECTOMY         Social History     Socioeconomic History    Marital status:      Spouse name: Not on file    Number of children: Not on file    Years of education: Not on file    Highest education level: Not on file   Occupational History    Not on file   Social Needs    Financial resource strain: Not on file    Food insecurity:     Worry: Not on file     Inability: Not on file    Transportation needs:     Medical: Not on file     Non-medical: Not on file   Tobacco Use    Smoking status: Former Smoker     Last attempt to quit: 1980     Years since quittin.0   Substance and Sexual Activity    Alcohol use: Not Currently    Drug use: Never    Sexual activity: Not Currently   Lifestyle    Physical activity:     Days per week: Not on file     Minutes per session: Not on file    Stress: Not on file   Relationships    Social connections:     Talks on phone: Not on file     Gets together: Not on file     Attends Congregational service: Not on file     Active member of club or organization: Not on file     Attends meetings of clubs or organizations: Not on file     Relationship status: Not on file   Other Topics Concern    Not on file   Social History Narrative     Not on file         CBC: No results for input(s): WBC, RBC, HGB, HCT, PLT, MCV, MCH, MCHC in the last 72 hours.    CMP: No results for input(s): NA, K, CL, CO2, BUN, CREATININE, GLU, MG, PHOS, CALCIUM, ALBUMIN, PROT, ALKPHOS, ALT, AST, BILITOT in the last 72 hours.    INR  No results for input(s): PT, INR, PROTIME, APTT in the last 72 hours.        Diagnostic Studies:      EKD Echo:  No results found for this or any previous visit.      Anesthesia Evaluation    I have reviewed the Patient Summary Reports.     I have reviewed the Medications.     Review of Systems  Anesthesia Hx:  History of prior surgery of interest to airway management or planning: Denies Family Hx of Anesthesia complications.   Denies Personal Hx of Anesthesia complications.       Physical Exam  General:  Well nourished    Airway/Jaw/Neck:  Airway Findings: Mouth Opening: Normal Tongue: Normal  General Airway Assessment: Adult  Mallampati: III  TM Distance: Normal, at least 6 cm  Jaw/Neck Findings:  Neck ROM: Normal ROM      Dental:  Dental Findings: Upper Dentures, Lower partial dentures   Chest/Lungs:  Chest/Lungs Findings: Clear to auscultation, Normal Respiratory Rate         Mental Status:  Mental Status Findings:  Cooperative, Alert and Oriented         Anesthesia Plan  Type of Anesthesia, risks & benefits discussed:  Anesthesia Type:  general  Patient's Preference:   Intra-op Monitoring Plan: standard ASA monitors  Intra-op Monitoring Plan Comments:   Post Op Pain Control Plan: multimodal analgesia  Post Op Pain Control Plan Comments:   Induction:   IV  Beta Blocker:         Informed Consent: Patient understands risks and agrees with Anesthesia plan.  Questions answered. Anesthesia consent signed with patient.  ASA Score: 3     Day of Surgery Review of History & Physical:    H&P update referred to the provider.         Ready For Surgery From Anesthesia Perspective.

## 2020-02-05 NOTE — Clinical Note
Lead inserted, under fluorscopic guidance, into the coronary sinus. New leads were attached to the following locations: coronary sinus.

## 2020-02-05 NOTE — Clinical Note
A venogram was performed in the coronary sinus. A balloon was inserted. The vessel was injected with hand injection with 7 mL of contrast. The balloon was removed.

## 2020-02-05 NOTE — Clinical Note
A dressing is applied to the incision. Dressing applied after dermabond dries then apply pressure dressing

## 2020-02-05 NOTE — INTERVAL H&P NOTE
The patient has been examined and the H&P has been reviewed:    I concur with the findings and no changes have occurred since H&P was written.   We discussed the alternatives, benefits and risks of the procedure including pain, infection, bleeding, injury to lung causing pneumothorax requiring tube placement, injury to heart valves, puncture of the heart leading to pericardial effusion or tamponade requiring tube drainage, heart attack, stroke and death.  Consent confirmed in chart.   No fever, chills, lightheadedness, dizziness, syncope, shortness of breath, or orthopnea.     Anesthesia/Surgery risks, benefits and alternative options discussed and understood by patient/family.          Active Hospital Problems    Diagnosis  POA    Chronic combined systolic and diastolic heart failure [I50.42]  Yes     Chronic      Resolved Hospital Problems   No resolved problems to display.

## 2020-02-05 NOTE — Clinical Note
Existing device removed from the pocket and returned to manufacture for disposal MDT Model: [id number] Everette KOENIG DR SN: [id number] IMP: 1/25/2019

## 2020-02-05 NOTE — TRANSFER OF CARE
"Anesthesia Transfer of Care Note    Patient: Iwona Gupta    Procedure(s) Performed: Procedure(s) (LRB):  INSERTION, ICD, BIVENTRICULAR (N/A)    Patient location: PACU    Anesthesia Type: general    Transport from OR: Transported from OR on room air with adequate spontaneous ventilation    Post pain: adequate analgesia    Post assessment: no apparent anesthetic complications and tolerated procedure well    Post vital signs: stable    Level of consciousness: awake, alert and oriented    Nausea/Vomiting: no nausea/vomiting    Complications: none    Transfer of care protocol was followed      Last vitals:   Visit Vitals  /65 (BP Location: Left arm, Patient Position: Lying)   Pulse 60   Temp 36.6 °C (97.9 °F) (Oral)   Resp 18   Ht 5' 4" (1.626 m)   Wt 75.3 kg (166 lb)   SpO2 95%   Breastfeeding? No   BMI 28.49 kg/m²     "

## 2020-02-05 NOTE — Clinical Note
Lead connected to generator in the . New leads were attached to the following locations: other lead. bundle of his

## 2020-02-05 NOTE — Clinical Note
PRE-EXISTING Device Info:  RA- MDT, Model: 5076-45 SN: [id number] IMP: 1/25/2019  RV- MDT Model: 6935M-62 SN: [id number] IMP: 1/25/2019  Device: MDT Model: [id number] Everette KOENIG DR SN: [id number] IMP: 1/25/2019

## 2020-02-05 NOTE — BRIEF OP NOTE
: Jose Tse MD    Post-operative Diagnosis: Cardiomyopathy, LBBB    Procedure Performed: HIS lead implant    Description of Procedure: The patient was brought to the EP lab in the fasting state. Prepped and draped in sterile fashion. Safety timeout was performed. Sedation administered by anesthesia staff. Selective venogram of the left axillary and cephalic veins performed via left ac IV. Lidocaine used for local anesthetic. Fluoroscopic guided axillary access utilized. Guide Wire advanced and confirmed in IVC. Incision made. Blunt and electrocautery dissection performed to level of existing generator. Hematoma present in capsule. Capsule swabbed and sent for culture.  Attempt at CS lead placement unsuccessful secondary to anatomy. HIS lead placed with non selective capture. Generator placed into pocket, sutured in place, and washed with antibiotic solution. Deep layer closed with interrupted 3-0 suture. Intermediate layer closed with running 3-0 suture. Superficial layer closed with running 4-0 suture. Skin closed with Dermabond. Meplex dressing to be placed after dermabond has dried.     EBL: <10 mL    Specimens Removed: None  Complications: no immediate    1. Ancef 2 grams q8 hours x 2 doses (ordered)  2. Sling to left arm - wear for 48 hours, then only at night for 6 weeks.  3. NO HEPARIN PRODUCTS  4. Keflex 500 mg TID for 5 days at discharge (or after the 2 doses of IV antibiotics if still in the hospital)  5. No lifting left elbow above shoulder height  6. No lifting over 5 pounds  7. No driving for 1 week and for 4 weeks if patient uses left arm to make turns  8. Dressing will be removed in AM by EP  9. Chest Xray (ordered)  10. Follow up in device clinic in 1 week for device and wound checks.     Dr Tse, the attending electrophysiologist, was present for the entirety of this procedure.    Shawn Velasco MD PGY7

## 2020-02-06 VITALS
OXYGEN SATURATION: 95 % | TEMPERATURE: 98 F | WEIGHT: 166 LBS | RESPIRATION RATE: 17 BRPM | SYSTOLIC BLOOD PRESSURE: 173 MMHG | HEART RATE: 67 BPM | BODY MASS INDEX: 28.34 KG/M2 | DIASTOLIC BLOOD PRESSURE: 72 MMHG | HEIGHT: 64 IN

## 2020-02-06 LAB
POCT GLUCOSE: 122 MG/DL (ref 70–110)
POCT GLUCOSE: 245 MG/DL (ref 70–110)

## 2020-02-06 RX ORDER — CEPHALEXIN 500 MG/1
500 CAPSULE ORAL EVERY 8 HOURS
Qty: 15 CAPSULE | Refills: 0 | Status: SHIPPED | OUTPATIENT
Start: 2020-02-06 | End: 2020-02-11

## 2020-02-06 NOTE — PLAN OF CARE
Patient's VSS, elevated BP due to not having morning meds, but being discharged and stated will take her meds when she is home.Discharged paperwork reviewed with patient and patient's daughter @ bedside, showed instruction on how to do sling & swathe. Incision approximated & intact with dermabond present. Patient stated pain 5/10 on pain scale but no tylenol available in pyxis or in pt's bin, pharmacy notified but patient discharged with no med available. Tele box, pt paced 60s maintained prior to discharge. Tele box removed and returned. IVs removed, catheter intact with no complications. Discharged with daughter via wheelchair with transport.

## 2020-02-06 NOTE — DISCHARGE INSTRUCTIONS
Sling to left arm - wear for 24 hours, then only at night for 6 weeks.  NO HEPARIN PRODUCTS  Keflex 500 mg TID for 5 days at discharge  No lifting left elbow above shoulder height  No lifting over 5 pounds  No driving for 1 week and for 4 weeks if patient uses left arm to make turns  Patient may shower in 24 hours, do not let beam of shower hit site directly and no scrubbing in area  Follow up in device clinic in 1 week and with Dr Tse in 6 weeks.

## 2020-02-06 NOTE — NURSING TRANSFER
Nursing Transfer Note      2/5/2020     Transfer To: 744    Transfer via stretcher    Transfer with cardiac monitoring    Transported by Patient escort    Medicines sent: no    Chart send with patient: Yes    Notified: daughter

## 2020-02-06 NOTE — ANESTHESIA POSTPROCEDURE EVALUATION
Anesthesia Post Evaluation    Patient: Iwona Gupta    Procedure(s) Performed: Procedure(s) (LRB):  INSERTION, ICD, BIVENTRICULAR (N/A)    Final Anesthesia Type: general    Patient location during evaluation: PACU  Patient participation: Yes- Able to Participate  Level of consciousness: awake and alert and oriented  Post-procedure vital signs: reviewed and stable  Pain management: adequate  Airway patency: patent    PONV status at discharge: No PONV  Anesthetic complications: no      Cardiovascular status: blood pressure returned to baseline and hemodynamically stable  Respiratory status: unassisted  Hydration status: euvolemic  Follow-up not needed.          Vitals Value Taken Time   /72 2/6/2020  8:07 AM   Temp 36.5 °C (97.7 °F) 2/6/2020  8:07 AM   Pulse 67 2/6/2020  8:07 AM   Resp 17 2/6/2020  8:07 AM   SpO2 95 % 2/6/2020  8:07 AM         No case tracking events are documented in the log.      Pain/Kofi Score: Pain Rating Prior to Med Admin: 5 (2/6/2020  8:07 AM)  Pain Rating Post Med Admin: 0 (eyes closed, RRR, no acute distress noted) (2/5/2020 11:15 PM)  Kofi Score: 10 (2/5/2020  6:47 PM)

## 2020-02-06 NOTE — DISCHARGE SUMMARY
"[facility]  Cardiac Electrophysiology  Discharge Summary      Patient Name: Iwona Gupta  MRN: [id number]  Admission Date: 2/5/2020  Hospital Length of Stay: 0 days  Discharge Date and Time: 2/6/2020 10:20 AM  Attending Physician: Jose Tse MD   Discharging Provider: Shawn Velasco MD  Primary Care Physician: Artur Morrison MD    HPI:   Ms Gupta is a 79 year old lady with CMO EF less than 30% despite optimal GDMT with LBBB and NYHA class II-III symptoms who [place] [facility] [place] to CRT.     From Dr [facility] note 1/7/20:  "I had the pleasure of seeing Ms. Gupta in our electrophysiology clinic in consultation for her nonischemic cardiomyopathy. As you are aware she is a pleasant 79 year-old woman with chronic nonischemic CMP (EF<30%, NYHA class II symptoms) despite long-term goal directed medical therapy with wide LBBB, paroxysmal atrial fibrillation, chronic kidney disease stage 4, hypertension, type 2 diabetes mellitus, hypothyroidism, and obstructive sleep apnea. She underwent attempt at CRT-D implantation January of 2019 that was complicated by dissection of the coronary sinus. A dual chamber ICD was implanted instead. She returned for reattempt at implantation September of 2019 and was unable to have the lead delivered. She presents today for evaluation for 3rd attempt at implantation of a coronary sinus lead. She has a cardiomems implant."    Procedure(s) (LRB):  INSERTION, ICD, BIVENTRICULAR (N/A)     Indwelling Lines/Drains at time of discharge:  Lines/Drains/Airways     None                 Hospital Course:  Underwent placement of non selective HIS bundle lead. CS anatomy precluded placement of CS lead thus HIS lead placed. There was old hematoma in capsule which was swabbed for culture.     Significant Diagnostic Studies: Labs:   BMP: No results for input(s): GLU, NA, K, CL, CO2, BUN, CREATININE, CALCIUM, MG in the last 48 hours., CMP No results for " input(s): NA, K, CL, CO2, GLU, BUN, CREATININE, CALCIUM, PROT, ALBUMIN, BILITOT, ALKPHOS, AST, ALT, ANIONGAP, ESTGFRAFRICA, EGFRNONAA in the last 48 hours., CBC No results for input(s): WBC, HGB, HCT, PLT in the last 48 hours. 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  Service: Arrhythmia      Discharged Condition: good    Disposition: Home or Self Care    Follow Up:  Follow-up Information     Kit Curry - Arrhythmia In 7 days.    Specialty:  Cardiology  Why:  medtronic ICD with HIS lead device and wound check  Contact information:  [facility] [id number] [phone]  Additional information:  [facility]           Jose Tse MD In 6 weeks.    Specialties:  Electrophysiology, Cardiology  Contact information:  [address]  [facility] [address]  [phone]                 Patient Instructions:      Other restrictions (specify):   Order Comments: Sling to left arm - wear for 24 hours, then only at night for 6 weeks.  NO HEPARIN PRODUCTS  Keflex 500 mg TID for 5 days at discharge  No lifting left elbow above shoulder height  No lifting over 5 pounds  No driving for 1 week and for 4 weeks if patient uses left arm to make turns  Patient may shower in 24 hours, do not let beam of shower hit site directly and no scrubbing in area  Follow up in device clinic in 1 week and with Dr Tse in 6 weeks.     Medications:  Reconciled Home Medications:      Medication List      START taking these medications    cephALEXin 500 MG capsule  Commonly known as:  KEFLEX  Take 1 capsule (500 mg total) by mouth  every 8 (eight) hours. for 5 days        CHANGE how you take these medications    Eliquis 5 mg Tab  Generic drug:  apixaban  Take 1 tablet (5 mg total) by mouth 2 (two) times daily.  Start taking on:  February 11, 2020  What changed:  These instructions start on February 11, 2020. If you are unsure what to do until then, ask your doctor or other care provider.        CONTINUE taking these medications    amiodarone 200 MG Tab  Commonly known as:  PACERONE  Take by mouth once daily.     amLODIPine 2.5 MG tablet  Commonly known as:  NORVASC  Take 2.5 mg by mouth once daily.     aspirin 81 MG Chew  Take 81 mg by mouth once daily.     atorvastatin 80 MG tablet  Commonly known as:  LIPITOR  Take 80 mg by mouth once daily.     bisacodyL 5 mg EC tablet  Commonly known as:  DULCOLAX  Take 5 mg by mouth daily as needed for Constipation.     calcium carbonate 600 mg calcium (1,500 mg) Tab  Commonly known as:  OS-RAJESH  Take 600 mg by mouth once.     carvediloL 12.5 MG tablet  Commonly known as:  COREG  Take 12.5 mg by mouth 2 (two) times daily with meals.     cyanocobalamin 1000 MCG tablet  Commonly known as:  VITAMIN B-12  Take 100 mcg by mouth once daily.     Entresto 49-51 mg per tablet  Generic drug:  sacubitril-valsartan  Take 1 tablet by mouth 2 (two) times daily.     ferrous sulfate 325 mg (65 mg iron) Tab tablet  Commonly known as:  FEOSOL  Take 325 mg by mouth daily with breakfast.     FLUoxetine 20 MG capsule  Take 20 mg by mouth once daily.     furosemide 80 MG tablet  Commonly known as:  LASIX  Take 80 mg by mouth 2 (two) times daily.     levothyroxine 100 MCG tablet  Commonly known as:  SYNTHROID  Take 100 mcg by mouth once daily.     pantoprazole 40 MG tablet  Commonly known as:  PROTONIX  Take 40 mg by mouth once daily.     ranitidine 150 MG capsule  Commonly known as:  ZANTAC  Take 150 mg by mouth 2 (two) times daily.     SITagliptin 25 MG Tab  Commonly known as:  JANUVIA  Take 100 mg by mouth once daily.      vitamin D 1000 units Tab  Commonly known as:  VITAMIN D3  Take 1,000 Units by mouth once daily.            Time spent on the discharge of patient: 30 minutes    Shawn Velasco MD  Cardiac Electrophysiology  [facility]

## 2020-02-06 NOTE — PLAN OF CARE
CM met with patient at the bedside to discuss discharge planning assessment. Pt lives with family and has transportation home at discharge.  Pt verified PCP and Pharmacy. CM will continue to follow for discharge needs.        02/06/20 1020   Discharge Assessment   Assessment Type Discharge Planning Assessment   Confirmed/corrected address and phone number on facesheet? Yes   Assessment information obtained from? Patient   Expected Length of Stay (days) 1   Communicated expected length of stay with patient/caregiver yes   Prior to hospitilization cognitive status: Alert/Oriented   Prior to hospitalization functional status: Independent   Current cognitive status: Alert/Oriented   Current Functional Status: Independent   Lives With child(jorje), adult;grandchild(jorje)   Able to Return to Prior Arrangements yes   Is patient able to care for self after discharge? Yes   Who are your caregiver(s) and their phone number(s)? Amada Gupta- daughter- [phone]   Patient's perception of discharge disposition home or selfcare   Readmission Within the Last 30 Days no previous admission in last 30 days   Patient currently being followed by outpatient case management? No   Patient currently receives any other outside agency services? No   Equipment Currently Used at Home glucometer   Do you have any problems affording any of your prescribed medications? No   Is the patient taking medications as prescribed? yes   Does the patient have transportation home? No   Does the patient receive services at the Coumadin Clinic? No   Discharge Plan A Home with family   Discharge Plan B Home with family   DME Needed Upon Discharge  none   Patient/Family in Agreement with Plan yes

## 2020-02-06 NOTE — PLAN OF CARE
02/06/20 1022   Final Note   Assessment Type Final Discharge Note   Anticipated Discharge Disposition Home   What phone number can be called within the next 1-3 days to see how you are doing after discharge? [phone]   Discharge plans and expectations educations in teach back method with documentation complete? Yes   Right Care Referral Info   Post Acute Recommendation No Care     Future Appointments   Date Time Provider Department Center   2/13/2020  3:00 PM COORDINATED DEVICE CHECK [facility]

## 2020-02-08 LAB — BACTERIA SPEC AEROBE CULT: NO GROWTH

## 2020-02-12 LAB — BACTERIA SPEC ANAEROBE CULT: NORMAL

## 2020-02-13 ENCOUNTER — CLINICAL SUPPORT (OUTPATIENT)
Dept: CARDIOLOGY | Facility: HOSPITAL | Age: 80
End: 2020-02-13
Attending: INTERNAL MEDICINE
Payer: MEDICARE

## 2020-02-13 DIAGNOSIS — I49.8 OTHER SPECIFIED CARDIAC ARRHYTHMIAS: ICD-10-CM

## 2020-02-13 DIAGNOSIS — Z95.810 ICD (IMPLANTABLE CARDIOVERTER-DEFIBRILLATOR), DUAL, IN SITU: ICD-10-CM

## 2020-02-13 PROCEDURE — 93284 PRGRMG EVAL IMPLANTABLE DFB: CPT

## 2020-03-05 ENCOUNTER — TELEPHONE (OUTPATIENT)
Dept: ELECTROPHYSIOLOGY | Facility: CLINIC | Age: 80
End: 2020-03-05

## 2020-04-28 ENCOUNTER — TELEPHONE (OUTPATIENT)
Dept: ELECTROPHYSIOLOGY | Facility: CLINIC | Age: 80
End: 2020-04-28

## 2020-04-28 NOTE — TELEPHONE ENCOUNTER
Spoke with  daughter Amada to inform them that all in clinic appointments are being cancelled due to COVID-19 concerns. Pt was offered a Virtual Visit but declined .  prefer to wait until things get back to normal to schedule another appointment. Pt was informed that someone from our office will canotact them to r/s appointment. Pt verbalized understanding.

## 2024-01-23 NOTE — LETTER
January 7, 2020      Artur Morrison MD  [address] [place] [address]  [place] [address]           [facility] - Arrhythmia  [address]  [place] [address]  Phone: [phone]  Fax: [phone]          Patient: Iwona Gupta   MR Number: [id number]   YOB: 1940   Date of Visit: 1/7/2020       Dear Dr. Artur Morrison:    Thank you for referring Iwona Gupta to me for evaluation. Attached you will find relevant portions of my assessment and plan of care.    If you have questions, please do not hesitate to call me. I look forward to following Iwnoa Gupta along with you.    Sincerely,    Jose Tse MD    Enclosure  CC:  No Recipients    If you would like to receive this communication electronically, please contact [email or web address] or [phone] to request more information on [employer] Link access.    For providers and/or their staff who would like to refer a patient to [facility], please contact us through our one-stop-shop provider referral line, [facility], at [phone].    If you feel you have received this communication in error or would no longer like to receive these types of communications, please e-mail [email or web address]          [Never] : never [TextBox_4] : MISHA QUIROZ is a 69 year old female who presents with sleep apnea diagnosis, had a PSG in 2019 severe- scanned into her chart on Eloquii machine- seeing urology for overactive bladder here to assess if untreated BASILIO Is adding to her symptoms since starting treatment with urology- less issues with bladder control but not resolved  has gained abotu 10-15lbs since her last PSG getting supplies no mask leak some dry mouth   never smoker traveling to [place] for a month tomorrow to return march 2024

## 2024-06-20 NOTE — Clinical Note
Follow-up end of July with contrast-enhanced CT scans of C/A/P, CBC, CMP, CEA level Wire inserted in the left axillary vein.

## (undated) DEVICE — CATH ANGIO RIGHTSITE HIS 7X43

## (undated) DEVICE — GUIDEWIRE CPS DIRECT PL 47CM

## (undated) DEVICE — WIRE WHISPER MS 014 X 190

## (undated) DEVICE — BLADE PLASMA WIDE SPATULA TIP

## (undated) DEVICE — WIRE GUIDE WHOLEY MOD J .035

## (undated) DEVICE — SLING SWATHE UNIVERSAL FOAM

## (undated) DEVICE — KIT WRENCH

## (undated) DEVICE — ADHESIVE DERMABOND ADVANCED

## (undated) DEVICE — BALLOON WEDGE PRESSURE CATHETER

## (undated) DEVICE — GUIDEWIRE CPS COURIER MED

## (undated) DEVICE — PAD DEFIB CADENCE ADULT R2

## (undated) DEVICE — LEAD 459888 MRI S-TIP US
Type: IMPLANTABLE DEVICE | Site: HEART | Status: NON-FUNCTIONAL
Brand: ATTAIN PERFORMA™ S MRI SURESCAN™

## (undated) DEVICE — PACK PACER PERMANENT

## (undated) DEVICE — SHEATH SAFE ULTRA 9FR

## (undated) DEVICE — BANDAGE ELASTOPLAST 3INX5YDS

## (undated) DEVICE — LEAD 429888 MRI CANT US
Type: IMPLANTABLE DEVICE | Site: HEART | Status: NON-FUNCTIONAL
Brand: ATTAIN PERFORMA™ MRI SURESCAN™
Removed: 2020-02-05

## (undated) DEVICE — GUIDEWIRE CPS DIRECT 54CM 7FR

## (undated) DEVICE — CATH ATTAIN COMMAND ACCESSORY

## (undated) DEVICE — DRAPE INCISE IOBAN 2 23X17IN

## (undated) DEVICE — DRESSING AQUACEL FOAM 5 X 5

## (undated) DEVICE — ELECTRODE REM PLYHSV RETURN 9